# Patient Record
Sex: MALE | Race: WHITE
[De-identification: names, ages, dates, MRNs, and addresses within clinical notes are randomized per-mention and may not be internally consistent; named-entity substitution may affect disease eponyms.]

---

## 2018-06-25 ENCOUNTER — HOSPITAL ENCOUNTER (OUTPATIENT)
Dept: HOSPITAL 84 - D.RAD | Age: 83
Discharge: HOME | End: 2018-06-25
Attending: NURSE PRACTITIONER
Payer: MEDICARE

## 2018-06-25 DIAGNOSIS — M79.601: Primary | ICD-10-CM

## 2018-07-25 ENCOUNTER — HOSPITAL ENCOUNTER (OUTPATIENT)
Dept: HOSPITAL 84 - D.ECHO | Age: 83
Discharge: HOME | End: 2018-07-25
Attending: INTERNAL MEDICINE
Payer: MEDICARE

## 2018-07-25 DIAGNOSIS — R06.02: ICD-10-CM

## 2018-07-25 DIAGNOSIS — R07.9: ICD-10-CM

## 2018-07-25 DIAGNOSIS — I10: Primary | ICD-10-CM

## 2018-07-25 DIAGNOSIS — R00.2: ICD-10-CM

## 2019-01-17 ENCOUNTER — HOSPITAL ENCOUNTER (OUTPATIENT)
Dept: HOSPITAL 84 - D.ER | Age: 84
Setting detail: OBSERVATION
LOS: 3 days | Discharge: HOME | End: 2019-01-20
Attending: FAMILY MEDICINE | Admitting: FAMILY MEDICINE
Payer: MEDICARE

## 2019-01-17 VITALS
BODY MASS INDEX: 30.48 KG/M2 | BODY MASS INDEX: 30.48 KG/M2 | WEIGHT: 230 LBS | HEIGHT: 73 IN | BODY MASS INDEX: 30.48 KG/M2

## 2019-01-17 VITALS — DIASTOLIC BLOOD PRESSURE: 80 MMHG | SYSTOLIC BLOOD PRESSURE: 169 MMHG

## 2019-01-17 VITALS — SYSTOLIC BLOOD PRESSURE: 168 MMHG | DIASTOLIC BLOOD PRESSURE: 88 MMHG

## 2019-01-17 VITALS — SYSTOLIC BLOOD PRESSURE: 175 MMHG | DIASTOLIC BLOOD PRESSURE: 95 MMHG

## 2019-01-17 DIAGNOSIS — S00.93XA: ICD-10-CM

## 2019-01-17 DIAGNOSIS — F11.929: Primary | ICD-10-CM
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
## 2019-01-17 DIAGNOSIS — G89.29: ICD-10-CM

## 2019-01-17 LAB
ALBUMIN SERPL-MCNC: 3.3 G/DL (ref 3.4–5)
ALP SERPL-CCNC: 77 U/L (ref 46–116)
ALT SERPL-CCNC: 25 U/L (ref 10–68)
ANION GAP SERPL CALC-SCNC: 17.4 MMOL/L (ref 8–16)
APTT BLD: 24.7 SECONDS (ref 22.8–39.4)
BASOPHILS NFR BLD AUTO: 0.2 % (ref 0–2)
BILIRUB SERPL-MCNC: 0.31 MG/DL (ref 0.2–1.3)
BUN SERPL-MCNC: 25 MG/DL (ref 7–18)
CALCIUM SERPL-MCNC: 8.6 MG/DL (ref 8.5–10.1)
CHLORIDE SERPL-SCNC: 102 MMOL/L (ref 98–107)
CK MB SERPL-MCNC: 11.3 U/L (ref 0–3.6)
CK SERPL-CCNC: 658 UL (ref 21–232)
CO2 SERPL-SCNC: 25 MMOL/L (ref 21–32)
CREAT SERPL-MCNC: 1.1 MG/DL (ref 0.6–1.3)
EOSINOPHIL NFR BLD: 0 % (ref 0–7)
ERYTHROCYTE [DISTWIDTH] IN BLOOD BY AUTOMATED COUNT: 14 % (ref 11.5–14.5)
ETHANOL SERPL-MCNC: 1 MG/DL (ref 0–10)
GLOBULIN SER-MCNC: 3.5 G/L
GLUCOSE SERPL-MCNC: 121 MG/DL (ref 74–106)
HCT VFR BLD CALC: 38.6 % (ref 42–54)
HGB BLD-MCNC: 12.7 G/DL (ref 13.5–17.5)
IMM GRANULOCYTES NFR BLD: 0.4 % (ref 0–5)
INR PPP: 1.02 (ref 0.85–1.17)
LYMPHOCYTES NFR BLD AUTO: 8.8 % (ref 15–50)
MAGNESIUM SERPL-MCNC: 1.8 MG/DL (ref 1.8–2.4)
MCH RBC QN AUTO: 29.7 PG (ref 26–34)
MCHC RBC AUTO-ENTMCNC: 32.9 G/DL (ref 31–37)
MCV RBC: 90.4 FL (ref 80–100)
MONOCYTES NFR BLD: 5 % (ref 2–11)
NEUTROPHILS NFR BLD AUTO: 85.6 % (ref 40–80)
OSMOLALITY SERPL CALC.SUM OF ELEC: 283 MOSM/KG (ref 275–300)
PLATELET # BLD: 180 10X3/UL (ref 130–400)
PMV BLD AUTO: 9.9 FL (ref 7.4–10.4)
POTASSIUM SERPL-SCNC: 4.4 MMOL/L (ref 3.5–5.1)
PROT SERPL-MCNC: 6.8 G/DL (ref 6.4–8.2)
PROTHROMBIN TIME: 12.9 SECONDS (ref 11.6–15)
RBC # BLD AUTO: 4.27 10X6/UL (ref 4.2–6.1)
SODIUM SERPL-SCNC: 140 MMOL/L (ref 136–145)
TROPONIN I SERPL-MCNC: < 0.017 NG/ML (ref 0–0.06)
TSH SERPL-ACNC: 4.26 UIU/ML (ref 0.36–3.74)
WBC # BLD AUTO: 9.9 10X3/UL (ref 4.8–10.8)

## 2019-01-18 VITALS — SYSTOLIC BLOOD PRESSURE: 140 MMHG | DIASTOLIC BLOOD PRESSURE: 71 MMHG

## 2019-01-18 VITALS — SYSTOLIC BLOOD PRESSURE: 161 MMHG | DIASTOLIC BLOOD PRESSURE: 96 MMHG

## 2019-01-18 VITALS — DIASTOLIC BLOOD PRESSURE: 98 MMHG | SYSTOLIC BLOOD PRESSURE: 178 MMHG

## 2019-01-18 VITALS — SYSTOLIC BLOOD PRESSURE: 168 MMHG | DIASTOLIC BLOOD PRESSURE: 88 MMHG

## 2019-01-18 VITALS — SYSTOLIC BLOOD PRESSURE: 174 MMHG | DIASTOLIC BLOOD PRESSURE: 95 MMHG

## 2019-01-18 VITALS — DIASTOLIC BLOOD PRESSURE: 73 MMHG | SYSTOLIC BLOOD PRESSURE: 148 MMHG

## 2019-01-18 VITALS — SYSTOLIC BLOOD PRESSURE: 158 MMHG | DIASTOLIC BLOOD PRESSURE: 91 MMHG

## 2019-01-18 LAB
APPEARANCE UR: (no result)
BACTERIA #/AREA URNS HPF: (no result) /HPF
BILIRUB SERPL-MCNC: NEGATIVE MG/DL
COLOR UR: YELLOW
GLUCOSE SERPL-MCNC: NEGATIVE MG/DL
KETONES UR STRIP-MCNC: NEGATIVE MG/DL
NITRITE UR-MCNC: NEGATIVE MG/ML
PH UR STRIP: 6 [PH] (ref 5–6)
PROT UR-MCNC: (no result) MG/DL
RBC #/AREA URNS HPF: (no result) /HPF (ref 0–5)
SP GR UR STRIP: 1.01 (ref 1–1.02)
SQUAMOUS #/AREA URNS HPF: (no result) /HPF (ref 0–5)
UROBILINOGEN UR-MCNC: NORMAL MG/DL
WBC #/AREA URNS HPF: (no result) /HPF (ref 0–5)

## 2019-01-19 VITALS — SYSTOLIC BLOOD PRESSURE: 126 MMHG | DIASTOLIC BLOOD PRESSURE: 79 MMHG

## 2019-01-19 VITALS — DIASTOLIC BLOOD PRESSURE: 83 MMHG | SYSTOLIC BLOOD PRESSURE: 168 MMHG

## 2019-01-19 VITALS — DIASTOLIC BLOOD PRESSURE: 79 MMHG | SYSTOLIC BLOOD PRESSURE: 165 MMHG

## 2019-01-19 VITALS — SYSTOLIC BLOOD PRESSURE: 160 MMHG | DIASTOLIC BLOOD PRESSURE: 80 MMHG

## 2019-01-19 LAB
ANION GAP SERPL CALC-SCNC: 15.2 MMOL/L (ref 8–16)
BASOPHILS NFR BLD AUTO: 0.2 % (ref 0–2)
BUN SERPL-MCNC: 18 MG/DL (ref 7–18)
CALCIUM SERPL-MCNC: 8.5 MG/DL (ref 8.5–10.1)
CHLORIDE SERPL-SCNC: 104 MMOL/L (ref 98–107)
CO2 SERPL-SCNC: 24.6 MMOL/L (ref 21–32)
CREAT SERPL-MCNC: 1 MG/DL (ref 0.6–1.3)
EOSINOPHIL NFR BLD: 0.9 % (ref 0–7)
ERYTHROCYTE [DISTWIDTH] IN BLOOD BY AUTOMATED COUNT: 14.3 % (ref 11.5–14.5)
GLUCOSE SERPL-MCNC: 94 MG/DL (ref 74–106)
HCT VFR BLD CALC: 35.8 % (ref 42–54)
HGB BLD-MCNC: 11.9 G/DL (ref 13.5–17.5)
IMM GRANULOCYTES NFR BLD: 0.2 % (ref 0–5)
LYMPHOCYTES NFR BLD AUTO: 14.9 % (ref 15–50)
MCH RBC QN AUTO: 29.6 PG (ref 26–34)
MCHC RBC AUTO-ENTMCNC: 33.2 G/DL (ref 31–37)
MCV RBC: 89.1 FL (ref 80–100)
MONOCYTES NFR BLD: 10.9 % (ref 2–11)
NEUTROPHILS NFR BLD AUTO: 72.9 % (ref 40–80)
OSMOLALITY SERPL CALC.SUM OF ELEC: 280 MOSM/KG (ref 275–300)
PLATELET # BLD: 204 10X3/UL (ref 130–400)
PMV BLD AUTO: 10.6 FL (ref 7.4–10.4)
POTASSIUM SERPL-SCNC: 3.8 MMOL/L (ref 3.5–5.1)
RBC # BLD AUTO: 4.02 10X6/UL (ref 4.2–6.1)
SODIUM SERPL-SCNC: 140 MMOL/L (ref 136–145)
WBC # BLD AUTO: 8.9 10X3/UL (ref 4.8–10.8)

## 2019-01-19 NOTE — NUR
PT RESTING IN BED. NO SIGNS OF DISTRESS. IV TO RIGHT HAND PATENT NO REDNESS
OR TENDERNESS. HAS BRUISE TO FORHEAD FROM HOME. DENIES ANY NEED AT THIS TIME.
CALL LIGHT IN REACH. BED LOW POSITION. NO FAMILY AT BEDSIDE AT THIS TIME.

## 2019-01-19 NOTE — NUR
PT IN BED RESTING QUIETLY WITH EYES CLOSED. VITAL SIGNS STABLE AND AFEBRILE.
NO VISUAL CUES OF DISTRESS NOTED. WILL CONTINUE TO MONITOR.

## 2019-01-20 VITALS — SYSTOLIC BLOOD PRESSURE: 144 MMHG | DIASTOLIC BLOOD PRESSURE: 71 MMHG

## 2019-01-20 VITALS — SYSTOLIC BLOOD PRESSURE: 141 MMHG | DIASTOLIC BLOOD PRESSURE: 102 MMHG

## 2019-01-20 NOTE — NUR
PT RESTING IN BED. NO SIGNS OF DISTRESS. IV TO RIGHT HAND PATENT NO REDNESS
OR TENDERNESS. HAS BRUISE TO FORHEAD. ON TELEMETRY 91 SR. COMPLAINS OF NOT
BEING ABLE TO SLEEP AND ANXIETY. MED GIVEN THIS AM FOR ANXIETY. DENIES ANY
OTHER NEED AT THIS TIME. CALL LIGHT IN REACH. BED LOW POSITION. NO FAMILY AT
BEDSIDE. AWAITNG FOR  WANTS TO BE DISCHARGED TODAY.

## 2019-01-20 NOTE — MORECARE
CASE MANAGEMENT DISCHARGE SUMMARY
 
 
PATIENT: GENESIS DE ANDA                UNIT: [id number]
ACCOUNT#: [id number]                       ADM DATE: 19
AGE: 83     : 39  SEX: M            ROOM/BED: D.2211    
AUTHOR: KEMI MILLER                             PHYSICIAN:                               
 
REFERRING PHYSICIAN: BARRETT VICKERS MD            
DATE OF SERVICE: 19
Discharge Plan
 
 
Patient Name: GENESIS DE ANDA
Facility: [facility]:[place]
Encounter #: [id number]
Medical Record #: [id number]
: 1935
Planned Disposition: Home
Anticipated Discharge Date: 19
 
Discharge Date: 
Expected LOS: 2
Initial Reviewer: [email or web address]
Initial Review Date: 2019
Generated: 19   1:11 pm 
 DCPIA - Discharge Planning Initial Assessment
 
Updated by [email or web address]: Naomie Strauss on 19  12:08 pm
*  Is the patient Alert and Oriented?
Yes
*  How many steps to enter\exit or inside your home? none *  PCP DR Vickers *  Pharmacy
[facility] [address] [facility]
*  Preadmission Environment
Home Alone
*  ADLs
Independent
*  Equipment
Cane
*  Other Equipment
DENIES ANY ADDITIONAL DME
*  List name and contact numbers for known caregivers / representatives who 
currently or will assist patient after discharge:
PHU BERGERON- DAUGHTER- [phone]
*  Verbal permission to speak to the caregivers and representatives has been 
obtained from the patient.
No
*  Community resources currently utilized
None
*  Please name any agencies selected above.
 
N/A
*  Additional services required to return to the preadmission environment?
No
*  Can the patient safely return to the preadmission environment?
Yes
*  Has this patient been hospitalized within the prior 30 days at any 
hospital?
No
 
 
 
 
 
Coverage Notice
 
Reviewer: [email or web address] - Brianna Kaur
 
Notice Issued Date-Time: 2019  12:55
Notice Type: Medicare Outpatient Observation Notice
 
Notice Delivered To: Patient
Relationship to Patient: 
Representative Name: 
 
Delivery Method: HAND - Hand Delivered
Binta Days:
Prior Verbal Notification: 
 
Recipient Understood Notice: 
Recipient Signature: 
Med Rec Note Co-signed by Attending:
 
Coverage Notice Comment:  Patient A&O x 3 at time of signing
Patient Name: GENESIS DE ANDA
 
Encounter #: [id number]
Page [id number]
 
 
 
 
 
Electronically Signed by DOC MCCM on 19 at 1211
 
 
 
 
 
 
**All edits/amendments must be made on the electronic document**
 
DICTATION DATE: 19 121     : YOLANDA  19 1211     
RPT#: [id number]                                DC DATE:        
                                               STATUS: ADM IN  
[facility]
 [place] [address]
***END OF REPORT***

## 2019-01-20 NOTE — NUR
DISCHARGE INSTRUCTIONS GIVEN. SEEMS TO UNDERSTAND INSTRUCTIONS. IV OUT TIP
INTACT. TELEMETRY MONITOR TAKEN OFF AND RETURNED. NO SIGNS OF DISTRESS. LEFT
WITH HOSPTIAL STAFF TO GO HOME WITH FRIEND IN PERSONAL RIDE. DENIES ANY NEED
BEFORE LEAVING

## 2019-01-20 NOTE — MORECARE
CASE MANAGEMENT DISCHARGE SUMMARY
 
 
PATIENT: GENESIS DE ANDA                UNIT: [id number]
ACCOUNT#: [id number]                       ADM DATE: 19
AGE: 83     : 39  SEX: M            ROOM/BED: D.2211    
AUTHOR: PAUL,DOC                             PHYSICIAN:                               
 
REFERRING PHYSICIAN: BARRETT VICKERS MD            
DATE OF SERVICE: 19
Discharge Plan
 
 
Patient Name: GENESIS DE ANDA
Facility: [facility]:[place]
Encounter #: [id number]
Medical Record #: [id number]
: 1935
Planned Disposition: Home
Anticipated Discharge Date: 19
 
Discharge Date: 
Expected LOS: 2
Initial Reviewer: [email or web address]
Initial Review Date: 2019
Generated: 19   1:18 pm 
Comments
 
DCP- Discharge Planning
 
Updated by [email or web address]: Naomie Strauss on 19  11:17 am CT
ZEINAB MET WITH THE PATIENT AT THE BEDSIDE. HE PLANS ON BEING DISCHARGED TO HOME 
TODAY AFTER 2 PM. HE LIVES ALONE.  
 HE STATES HIS NEIGHBOR WILL BE PROVIDING TRANSPORTATION TO HOME.  
HE HAS NO STEPS OR STAIRS TO ENTER HIS HOME.  
HE UTILIZES A CANE TO AMBULATE. DENIES ANY ADDITIONAL DME.  
DISCUSSED AN EMERGENCY PERSONAL CALL SYSTEM. THE PATIENT STATED HE WOULD 
CONSIDER. ZEINAB PROVIDED HIS NURSE W/ THE NUMBER FOR ASSISTANCE FROM [facility] 
ON AG[place] FOR PROVIDERS IN [place]. INFO TO BE DOCUMENTED ON HIS DISCHARGE 
PAPERWORK [phone].  
INSTRUCTED PATIENT TO CALL HIS PAIN MANAGMENT PHYSICAN MONDAY AM. HE SEES DR DOW. ADVISED PRIMARY NURSE TO PLACE INSTRUCTIONS ON D/C INSTRUCTIONS. 
SPOKE W/ JAMEL JAVED RE: PAIN MGT FOLLOW-UP.  
PATIENT DENIES ANY ADDITIONAL NEEDS.
 DCPIA - Discharge Planning Initial Assessment
 
Updated by [id number]: Naomie Strauss on 19  12:08 pm
*  Is the patient Alert and Oriented?
Yes
*  How many steps to enter\exit or inside your home? none *  PCP DR Vickers *  Pharmacy
 
[facility] [address] [facility]
*  Preadmission Environment
Home Alone
*  ADLs
Independent
*  Equipment
Cane
*  Other Equipment
DENIES ANY ADDITIONAL DME
*  List name and contact numbers for known caregivers / representatives who 
currently or will assist patient after discharge:
PHU BERGERON- DAUGHTER- [phone]
*  Verbal permission to speak to the caregivers and representatives has been 
obtained from the patient.
No
*  Community resources currently utilized
None
*  Please name any agencies selected above.
N/A
*  Additional services required to return to the preadmission environment?
No
*  Can the patient safely return to the preadmission environment?
Yes
*  Has this patient been hospitalized within the prior 30 days at any 
hospital?
No
 
 
 
 
 
Coverage Notice
 
Reviewer: [email or web address] Devyn Kaur
 
Notice Issued Date-Time: 2019  12:55
Notice Type: Medicare Outpatient Observation Notice
 
Notice Delivered To: Patient
Relationship to Patient: 
Representative Name: 
 
Delivery Method: HAND - Hand Delivered
Binta Days:
Prior Verbal Notification: 
 
Recipient Understood Notice: 
Recipient Signature: 
Med Rec Note Co-signed by Attending:
 
Coverage Notice Comment:  Patient A&O x 3 at time of signing
 
 
Last DP export: 19  11:11 a
Patient Name: GENESIS DE ANDA
Encounter #: [id number]
Page [id number]
 
 
 
 
 
Electronically Signed by KEMI MILLER on 19 at 1218
 
 
 
 
 
 
**All edits/amendments must be made on the electronic document**
 
DICTATION DATE: 19     : YOLANDA  19 1217     
RPT#: [id number]                                DC DATE:        
                                               STATUS: ADM IN  
[facility]
[address] [place] [address]
***END OF REPORT***

## 2019-01-23 NOTE — MORECARE
CASE MANAGEMENT DISCHARGE SUMMARY
 
 
PATIENT: GENESIS DE ANDA                UNIT: [id number]
ACCOUNT#: [id number]                       ADM DATE: 19
AGE: 83     : 39  SEX: M            ROOM/BED: D.2211    
AUTHOR: PAULDOC                             PHYSICIAN:                               
 
REFERRING PHYSICIAN: BARRETT VICKERS MD            
DATE OF SERVICE: 19
Discharge Plan
 
 
Patient Name: GENESIS DE ANDA
Facility: [facility]:[place]
Encounter #: [id number]
Medical Record #: [id number]
: 1935
Planned Disposition: Home
Anticipated Discharge Date: 19
 
Discharge Date: 2019
Expected LOS: 2
Initial Reviewer: [email or web address]
Initial Review Date: 2019
Generated: 19   8:57 am 
Comments
 
DCP- Discharge Planning
 
Updated by [email or web address]: Naomie Strauss on 19  11:17 am CT
ZEINAB MET WITH THE PATIENT AT THE BEDSIDE. HE PLANS ON BEING DISCHARGED TO HOME 
TODAY AFTER 2 PM. HE LIVES ALONE.  
 HE STATES HIS NEIGHBOR WILL BE PROVIDING TRANSPORTATION TO HOME.  
HE HAS NO STEPS OR STAIRS TO ENTER HIS HOME.  
HE UTILIZES A CANE TO AMBULATE. DENIES ANY ADDITIONAL DME.  
DISCUSSED AN EMERGENCY PERSONAL CALL SYSTEM. THE PATIENT STATED HE WOULD 
CONSIDER. ZEINAB PROVIDED HIS NURSE W/ THE NUMBER FOR ASSISTANCE FROM [facility] 
ON [employer] FOR PROVIDERS IN [place]. INFO TO BE DOCUMENTED ON HIS DISCHARGE 
PAPERWORK [phone].  
INSTRUCTED PATIENT TO CALL HIS PAIN MANAGMENT PHYSICAN MONDAY AM. HE SEES DR DOW. ADVISED PRIMARY NURSE TO PLACE INSTRUCTIONS ON D/C INSTRUCTIONS. 
SPOKE W/ JAMEL JAVED RE: PAIN MGT FOLLOW-UP.  
PATIENT DENIES ANY ADDITIONAL NEEDS.
 DCPIA - Discharge Planning Initial Assessment
 
Updated by [email or web address]: Naomie Strauss on 19  12:08 pm
*  Is the patient Alert and Oriented?
Yes
*  How many steps to enter\exit or inside your home? none *  PCP DR Vickers *  Pharmacy
 
[facility] [address] [facility]
*  Preadmission Environment
Home Alone
*  ADLs
Independent
*  Equipment
Cane
*  Other Equipment
DENIES ANY ADDITIONAL DME
*  List name and contact numbers for known caregivers / representatives who 
currently or will assist patient after discharge:
PHU BERGERON- DAUGHTER- [phone]
*  Verbal permission to speak to the caregivers and representatives has been 
obtained from the patient.
No
*  Community resources currently utilized
None
*  Please name any agencies selected above.
N/A
*  Additional services required to return to the preadmission environment?
No
*  Can the patient safely return to the preadmission environment?
Yes
*  Has this patient been hospitalized within the prior 30 days at any 
hospital?
No
 
 
 
 
 
Coverage Notice
 
Reviewer: [id number] Devyn Kaur
 
Notice Issued Date-Time: 2019  12:55
Notice Type: Medicare Outpatient Observation Notice
 
Notice Delivered To: Patient
Relationship to Patient: 
Representative Name: 
 
Delivery Method: HAND - Hand Delivered
Binta Days:
Prior Verbal Notification: 
 
Recipient Understood Notice: 
Recipient Signature: 
Med Rec Note Co-signed by Attending:
 
Coverage Notice Comment:  Patient A&O x 3 at time of signing
 
 
Last DP export: 19  11:18 a
Patient Name: GENESIS DE ANDA
Encounter #: [id number]
Page [id number]
 
 
 
 
 
Electronically Signed by KEMI MILLER on 19 at 0758
 
 
 
 
 
 
**All edits/amendments must be made on the electronic document**
 
DICTATION DATE: 19 0757     : YOLANDA  19 0757     
RPT#: [id number]                                DC DATE:19
                                               STATUS: DIS IN  
[facility]
[address] [place] [address]
***END OF REPORT***

## 2019-08-07 ENCOUNTER — HOSPITAL ENCOUNTER (OUTPATIENT)
Dept: HOSPITAL 84 - D.RAD | Age: 84
Discharge: HOME | End: 2019-08-07
Attending: INTERNAL MEDICINE
Payer: MEDICARE

## 2019-08-07 VITALS — BODY MASS INDEX: 30.3 KG/M2

## 2019-08-07 DIAGNOSIS — M25.511: Primary | ICD-10-CM
